# Patient Record
Sex: MALE | Race: WHITE | NOT HISPANIC OR LATINO | URBAN - METROPOLITAN AREA
[De-identification: names, ages, dates, MRNs, and addresses within clinical notes are randomized per-mention and may not be internally consistent; named-entity substitution may affect disease eponyms.]

---

## 2017-05-22 ENCOUNTER — EMERGENCY (EMERGENCY)
Facility: HOSPITAL | Age: 52
LOS: 0 days | Discharge: HOME | End: 2017-05-22
Attending: EMERGENCY MEDICINE | Admitting: EMERGENCY MEDICINE
Payer: OTHER MISCELLANEOUS

## 2017-05-22 VITALS
RESPIRATION RATE: 18 BRPM | HEART RATE: 79 BPM | OXYGEN SATURATION: 100 % | SYSTOLIC BLOOD PRESSURE: 145 MMHG | TEMPERATURE: 96 F | DIASTOLIC BLOOD PRESSURE: 79 MMHG

## 2017-05-22 VITALS — WEIGHT: 164.91 LBS

## 2017-05-22 DIAGNOSIS — S61.011A LACERATION WITHOUT FOREIGN BODY OF RIGHT THUMB WITHOUT DAMAGE TO NAIL, INITIAL ENCOUNTER: ICD-10-CM
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
## 2017-05-22 PROCEDURE — 99283 EMERGENCY DEPT VISIT LOW MDM: CPT

## 2017-05-22 PROCEDURE — 12001 RPR S/N/AX/GEN/TRNK 2.5CM/<: CPT

## 2017-05-22 NOTE — ED STATDOCS - PROGRESS NOTE DETAILS
50 yo male presents with right thumb injury at work with sheet metal. Last tdap was february. FROM of the right thumb, no tendon involvement. -Luis Perez PA-C

## 2017-05-22 NOTE — ED STATDOCS - NS ED MD SCRIBE ATTENDING SCRIBE SECTIONS
REVIEW OF SYSTEMS/RESULTS/PAST MEDICAL/SURGICAL/SOCIAL HISTORY/PROGRESS NOTE/DISPOSITION/VITAL SIGNS( Pullset)/PHYSICAL EXAM/HISTORY OF PRESENT ILLNESS

## 2017-05-22 NOTE — ED STATDOCS - OBJECTIVE STATEMENT
50 y/o male with no PMHx presents to the ED c/o laceration and pain to right thumb about 2 hours ago. Pt was working with sheet metal and cut himself. Last tetanus shot 2/2017. WILLIAM

## 2017-05-22 NOTE — ED STATDOCS - CARE PLAN
Principal Discharge DX:	Laceration of finger, initial encounter  Secondary Diagnosis:	Laceration of right thumb, initial encounter